# Patient Record
Sex: FEMALE | Race: OTHER | Employment: UNEMPLOYED | URBAN - METROPOLITAN AREA
[De-identification: names, ages, dates, MRNs, and addresses within clinical notes are randomized per-mention and may not be internally consistent; named-entity substitution may affect disease eponyms.]

---

## 2023-04-11 ENCOUNTER — OFFICE VISIT (OUTPATIENT)
Dept: FAMILY MEDICINE CLINIC | Facility: CLINIC | Age: 43
End: 2023-04-11

## 2023-04-11 VITALS
OXYGEN SATURATION: 98 % | WEIGHT: 120 LBS | DIASTOLIC BLOOD PRESSURE: 73 MMHG | SYSTOLIC BLOOD PRESSURE: 112 MMHG | HEART RATE: 82 BPM | TEMPERATURE: 100 F | HEIGHT: 60.24 IN | BODY MASS INDEX: 23.25 KG/M2

## 2023-04-11 DIAGNOSIS — Z12.31 ENCOUNTER FOR SCREENING MAMMOGRAM FOR MALIGNANT NEOPLASM OF BREAST: ICD-10-CM

## 2023-04-11 DIAGNOSIS — R05.1 ACUTE COUGH: ICD-10-CM

## 2023-04-11 DIAGNOSIS — N30.00 ACUTE CYSTITIS WITHOUT HEMATURIA: Primary | ICD-10-CM

## 2023-04-11 LAB
BILIRUBIN: NEGATIVE
GLUCOSE (URINE DIPSTICK): NEGATIVE MG/DL
KETONES (URINE DIPSTICK): NEGATIVE MG/DL
LEUKOCYTES: NEGATIVE
MULTISTIX LOT#: NORMAL NUMERIC
NITRITE, URINE: NEGATIVE
OCCULT BLOOD: NEGATIVE
PH, URINE: 7 (ref 4.5–8)
PROTEIN (URINE DIPSTICK): NEGATIVE MG/DL
SPECIFIC GRAVITY: 1.02 (ref 1–1.03)
URINE-COLOR: YELLOW
UROBILINOGEN,SEMI-QN: 0.2 MG/DL (ref 0–1.9)

## 2023-04-11 PROCEDURE — 3078F DIAST BP <80 MM HG: CPT | Performed by: FAMILY MEDICINE

## 2023-04-11 PROCEDURE — 3008F BODY MASS INDEX DOCD: CPT | Performed by: FAMILY MEDICINE

## 2023-04-11 PROCEDURE — 99204 OFFICE O/P NEW MOD 45 MIN: CPT | Performed by: FAMILY MEDICINE

## 2023-04-11 PROCEDURE — 81003 URINALYSIS AUTO W/O SCOPE: CPT | Performed by: FAMILY MEDICINE

## 2023-04-11 PROCEDURE — 3074F SYST BP LT 130 MM HG: CPT | Performed by: FAMILY MEDICINE

## 2023-04-11 RX ORDER — BENZONATATE 200 MG/1
200 CAPSULE ORAL 3 TIMES DAILY PRN
Qty: 30 CAPSULE | Refills: 0 | Status: SHIPPED | OUTPATIENT
Start: 2023-04-11

## 2023-04-11 RX ORDER — LEVOCETIRIZINE DIHYDROCHLORIDE 5 MG/1
5 TABLET, FILM COATED ORAL EVERY EVENING
Qty: 30 TABLET | Refills: 0 | Status: SHIPPED | OUTPATIENT
Start: 2023-04-11

## 2023-04-18 ENCOUNTER — HOSPITAL ENCOUNTER (OUTPATIENT)
Dept: MAMMOGRAPHY | Age: 43
Discharge: HOME | End: 2023-04-18
Attending: FAMILY MEDICINE
Payer: COMMERCIAL

## 2023-04-18 DIAGNOSIS — Z12.31 ENCOUNTER FOR SCREENING MAMMOGRAM FOR MALIGNANT NEOPLASM OF BREAST: ICD-10-CM

## 2023-04-18 PROCEDURE — 77063 BREAST TOMOSYNTHESIS BI: CPT | Performed by: FAMILY MEDICINE

## 2023-04-18 PROCEDURE — 77067 SCR MAMMO BI INCL CAD: CPT | Performed by: FAMILY MEDICINE

## 2023-06-13 ENCOUNTER — OFFICE VISIT (OUTPATIENT)
Dept: FAMILY MEDICINE CLINIC | Facility: CLINIC | Age: 43
End: 2023-06-13

## 2023-06-13 VITALS
HEART RATE: 83 BPM | SYSTOLIC BLOOD PRESSURE: 110 MMHG | WEIGHT: 124 LBS | DIASTOLIC BLOOD PRESSURE: 74 MMHG | BODY MASS INDEX: 24.35 KG/M2 | HEIGHT: 60 IN

## 2023-06-13 DIAGNOSIS — Z00.00 PHYSICAL EXAM: Primary | ICD-10-CM

## 2023-06-13 DIAGNOSIS — K64.8 INTERNAL HEMORRHOID: ICD-10-CM

## 2023-06-13 DIAGNOSIS — Z12.11 COLON CANCER SCREENING: ICD-10-CM

## 2023-06-13 PROCEDURE — 90715 TDAP VACCINE 7 YRS/> IM: CPT | Performed by: FAMILY MEDICINE

## 2023-06-13 PROCEDURE — 99396 PREV VISIT EST AGE 40-64: CPT | Performed by: FAMILY MEDICINE

## 2023-06-13 PROCEDURE — 3078F DIAST BP <80 MM HG: CPT | Performed by: FAMILY MEDICINE

## 2023-06-13 PROCEDURE — 3074F SYST BP LT 130 MM HG: CPT | Performed by: FAMILY MEDICINE

## 2023-06-13 PROCEDURE — 90471 IMMUNIZATION ADMIN: CPT | Performed by: FAMILY MEDICINE

## 2023-06-13 PROCEDURE — 3008F BODY MASS INDEX DOCD: CPT | Performed by: FAMILY MEDICINE

## 2023-06-13 RX ORDER — HYDROCORTISONE 25 MG/G
1 CREAM TOPICAL 2 TIMES DAILY
Qty: 28 G | Refills: 1 | Status: SHIPPED | OUTPATIENT
Start: 2023-06-13

## 2023-06-16 ENCOUNTER — LAB ENCOUNTER (OUTPATIENT)
Dept: LAB | Age: 43
End: 2023-06-16
Attending: FAMILY MEDICINE
Payer: COMMERCIAL

## 2023-06-16 DIAGNOSIS — Z00.00 PHYSICAL EXAM: ICD-10-CM

## 2023-06-16 LAB
ALBUMIN SERPL-MCNC: 3.3 G/DL (ref 3.4–5)
ALBUMIN/GLOB SERPL: 0.8 {RATIO} (ref 1–2)
ALP LIVER SERPL-CCNC: 45 U/L
ALT SERPL-CCNC: 25 U/L
ANION GAP SERPL CALC-SCNC: 4 MMOL/L (ref 0–18)
AST SERPL-CCNC: 15 U/L (ref 15–37)
BASOPHILS # BLD AUTO: 0.07 X10(3) UL (ref 0–0.2)
BASOPHILS NFR BLD AUTO: 1.2 %
BILIRUB SERPL-MCNC: 0.5 MG/DL (ref 0.1–2)
BILIRUB UR QL STRIP.AUTO: NEGATIVE
BUN BLD-MCNC: 12 MG/DL (ref 7–18)
CALCIUM BLD-MCNC: 8.6 MG/DL (ref 8.5–10.1)
CHLORIDE SERPL-SCNC: 109 MMOL/L (ref 98–112)
CHOLEST SERPL-MCNC: 200 MG/DL (ref ?–200)
CLARITY UR REFRACT.AUTO: CLEAR
CO2 SERPL-SCNC: 25 MMOL/L (ref 21–32)
COLOR UR AUTO: YELLOW
CREAT BLD-MCNC: 0.68 MG/DL
EOSINOPHIL # BLD AUTO: 0.26 X10(3) UL (ref 0–0.7)
EOSINOPHIL NFR BLD AUTO: 4.4 %
ERYTHROCYTE [DISTWIDTH] IN BLOOD BY AUTOMATED COUNT: 12.4 %
EST. AVERAGE GLUCOSE BLD GHB EST-MCNC: 111 MG/DL (ref 68–126)
FASTING PATIENT LIPID ANSWER: YES
FASTING STATUS PATIENT QL REPORTED: YES
GFR SERPLBLD BASED ON 1.73 SQ M-ARVRAT: 109 ML/MIN/1.73M2 (ref 60–?)
GLOBULIN PLAS-MCNC: 4 G/DL (ref 2.8–4.4)
GLUCOSE BLD-MCNC: 75 MG/DL (ref 70–99)
GLUCOSE UR STRIP.AUTO-MCNC: NEGATIVE MG/DL
HBA1C MFR BLD: 5.5 % (ref ?–5.7)
HCT VFR BLD AUTO: 38.1 %
HDLC SERPL-MCNC: 74 MG/DL (ref 40–59)
HGB BLD-MCNC: 12.4 G/DL
IMM GRANULOCYTES # BLD AUTO: 0.01 X10(3) UL (ref 0–1)
IMM GRANULOCYTES NFR BLD: 0.2 %
KETONES UR STRIP.AUTO-MCNC: NEGATIVE MG/DL
LDLC SERPL CALC-MCNC: 116 MG/DL (ref ?–100)
LYMPHOCYTES # BLD AUTO: 2.3 X10(3) UL (ref 1–4)
LYMPHOCYTES NFR BLD AUTO: 39.2 %
MCH RBC QN AUTO: 31.4 PG (ref 26–34)
MCHC RBC AUTO-ENTMCNC: 32.5 G/DL (ref 31–37)
MCV RBC AUTO: 96.5 FL
MONOCYTES # BLD AUTO: 0.53 X10(3) UL (ref 0.1–1)
MONOCYTES NFR BLD AUTO: 9 %
NEUTROPHILS # BLD AUTO: 2.69 X10 (3) UL (ref 1.5–7.7)
NEUTROPHILS # BLD AUTO: 2.69 X10(3) UL (ref 1.5–7.7)
NEUTROPHILS NFR BLD AUTO: 46 %
NITRITE UR QL STRIP.AUTO: POSITIVE
NONHDLC SERPL-MCNC: 126 MG/DL (ref ?–130)
OSMOLALITY SERPL CALC.SUM OF ELEC: 284 MOSM/KG (ref 275–295)
PH UR STRIP.AUTO: 6 [PH] (ref 5–8)
PLATELET # BLD AUTO: 215 10(3)UL (ref 150–450)
POTASSIUM SERPL-SCNC: 3.5 MMOL/L (ref 3.5–5.1)
PROT SERPL-MCNC: 7.3 G/DL (ref 6.4–8.2)
PROT UR STRIP.AUTO-MCNC: NEGATIVE MG/DL
RBC # BLD AUTO: 3.95 X10(6)UL
SODIUM SERPL-SCNC: 138 MMOL/L (ref 136–145)
SP GR UR STRIP.AUTO: 1.01 (ref 1–1.03)
TRIGL SERPL-MCNC: 53 MG/DL (ref 30–149)
TSI SER-ACNC: 3.45 MIU/ML (ref 0.36–3.74)
UROBILINOGEN UR STRIP.AUTO-MCNC: <2 MG/DL
VIT D+METAB SERPL-MCNC: 30.7 NG/ML (ref 30–100)
VLDLC SERPL CALC-MCNC: 9 MG/DL (ref 0–30)
WBC # BLD AUTO: 5.9 X10(3) UL (ref 4–11)

## 2023-06-16 PROCEDURE — 81001 URINALYSIS AUTO W/SCOPE: CPT

## 2023-06-16 PROCEDURE — 84443 ASSAY THYROID STIM HORMONE: CPT

## 2023-06-16 PROCEDURE — 85025 COMPLETE CBC W/AUTO DIFF WBC: CPT

## 2023-06-16 PROCEDURE — 82306 VITAMIN D 25 HYDROXY: CPT

## 2023-06-16 PROCEDURE — 80053 COMPREHEN METABOLIC PANEL: CPT

## 2023-06-16 PROCEDURE — 80061 LIPID PANEL: CPT

## 2023-06-16 PROCEDURE — 36415 COLL VENOUS BLD VENIPUNCTURE: CPT

## 2023-06-16 PROCEDURE — 83036 HEMOGLOBIN GLYCOSYLATED A1C: CPT

## 2023-06-16 PROCEDURE — 87186 SC STD MICRODIL/AGAR DIL: CPT

## 2023-06-16 PROCEDURE — 87086 URINE CULTURE/COLONY COUNT: CPT

## 2023-06-16 PROCEDURE — 87077 CULTURE AEROBIC IDENTIFY: CPT

## 2023-06-16 PROCEDURE — 86480 TB TEST CELL IMMUN MEASURE: CPT

## 2023-06-18 RX ORDER — CIPROFLOXACIN 250 MG/1
250 TABLET, FILM COATED ORAL 2 TIMES DAILY
Qty: 20 TABLET | Refills: 0 | Status: SHIPPED | OUTPATIENT
Start: 2023-06-18 | End: 2023-06-28

## 2023-06-19 LAB
M TB IFN-G CD4+ T-CELLS BLD-ACNC: 0.02 IU/ML
M TB TUBERC IFN-G BLD QL: NEGATIVE
M TB TUBERC IGNF/MITOGEN IGNF CONTROL: >10 IU/ML
QFT TB1 AG MINUS NIL: 0.01 IU/ML
QFT TB2 AG MINUS NIL: 0.02 IU/ML

## 2023-08-28 ENCOUNTER — NURSE TRIAGE (OUTPATIENT)
Dept: FAMILY MEDICINE CLINIC | Facility: CLINIC | Age: 43
End: 2023-08-28

## 2023-08-28 NOTE — TELEPHONE ENCOUNTER
Action Requested: Summary for Provider     []  Critical Lab, Recommendations Needed  [] Need Additional Advice  []   FYI    []   Need Orders  [] Need Medications Sent to Pharmacy  []  Other     Janet Maguire made, pt only wanted Dr Adair Haque, can wait 2 weeks, pain left breast come /goes , no nipple discharge   With assist of LL- sent [employer]/[id number]    Reason for call: Breast Pain  Onset: 1 week                   Reason for Disposition   Breast pain or tenderness that occurs monthly before menstrual period, and has NOT been evaluated by a doctor (or NP/PA)    Protocols used: Breast Symptoms-A-[place]

## 2023-09-05 ENCOUNTER — OFFICE VISIT (OUTPATIENT)
Dept: FAMILY MEDICINE CLINIC | Facility: CLINIC | Age: 43
End: 2023-09-05

## 2023-09-05 VITALS
HEART RATE: 78 BPM | SYSTOLIC BLOOD PRESSURE: 112 MMHG | WEIGHT: 123 LBS | DIASTOLIC BLOOD PRESSURE: 76 MMHG | BODY MASS INDEX: 24.15 KG/M2 | HEIGHT: 60 IN

## 2023-09-05 DIAGNOSIS — F32.9 REACTIVE DEPRESSION: ICD-10-CM

## 2023-09-05 DIAGNOSIS — N60.12 FIBROCYSTIC DISEASE OF LEFT BREAST: Primary | ICD-10-CM

## 2023-09-05 PROCEDURE — 3078F DIAST BP <80 MM HG: CPT | Performed by: FAMILY MEDICINE

## 2023-09-05 PROCEDURE — 3074F SYST BP LT 130 MM HG: CPT | Performed by: FAMILY MEDICINE

## 2023-09-05 PROCEDURE — 99213 OFFICE O/P EST LOW 20 MIN: CPT | Performed by: FAMILY MEDICINE

## 2023-09-05 PROCEDURE — 3008F BODY MASS INDEX DOCD: CPT | Performed by: FAMILY MEDICINE

## 2023-10-05 ENCOUNTER — TELEPHONE (OUTPATIENT)
Dept: FAMILY MEDICINE CLINIC | Facility: CLINIC | Age: 43
End: 2023-10-05

## 2023-10-05 DIAGNOSIS — N60.12 FIBROCYSTIC DISEASE OF LEFT BREAST: Primary | ICD-10-CM

## 2023-10-05 NOTE — TELEPHONE ENCOUNTER
MRN:[id number]                      After Visit Summary   3/16/2017    Serena Cunningham    MRN: [id number]           Visit Information        Provider Department      3/16/2017 5:30 PM [facility] Generic      Your next 10 appointments already scheduled     Mar 23, 2017  5:30 PM CDT   Return Visit with [facility] Tran [facility] ([facility]  [address] [address]   [phone]            Mar 28, 2017  5:00 PM CDT   Office Visit with Whitney Rob, DO   [facility]    [address] [id number] [phone]           Bring a current list of meds and any records pertaining to this visit.  For Physicals, please bring immunization records and any forms needing to be filled out.  Please arrive 10 minutes early to complete paperwork.            Mar 30, 2017  5:30 PM CDT   Return Visit with Unique Tran [facility] ([facility]  [address] [address] [phone]              MyChart Information     [employer] gives you secure access to your electronic health record. If you see a primary care provider, you can also send messages to your care team and make appointments. If you have questions, please call your primary care clinic.  If you do not have a primary care provider, please call [phone] and they will assist you.        Care EveryWhere ID     This is your Care EveryWhere ID. This could be used by other organizations to access your [place] medical records  [id number]         Brie Ramos with [facility] Radiology, needs to know if the ultrasound of the left breast is needed due to a new issue.  If ultrasound will be for a new issue, patient will need a diagnostic mammogram for left breast. Brie Ramos is requesting a call back

## 2023-10-06 NOTE — TELEPHONE ENCOUNTER
The question needing clarification is the reason for the ultrasound , is this a new issue? If do Mammo order (diagnostic) is needed.      Please clarify

## 2023-10-07 NOTE — TELEPHONE ENCOUNTER
Please call Radiology and informed them that she recently had a screening mammography April 2023. I don't believe that the want to repeat a screening mammography.

## 2023-10-09 NOTE — TELEPHONE ENCOUNTER
Patient has a NEW breast issue, please see what is needed    Diagnostic left mammogram order     Radiology is asking for order they are aware of previous imaging

## 2023-12-13 ENCOUNTER — OFFICE VISIT (OUTPATIENT)
Dept: FAMILY MEDICINE CLINIC | Facility: CLINIC | Age: 43
End: 2023-12-13

## 2023-12-13 ENCOUNTER — LAB ENCOUNTER (OUTPATIENT)
Dept: LAB | Age: 43
End: 2023-12-13
Attending: FAMILY MEDICINE
Payer: COMMERCIAL

## 2023-12-13 VITALS — HEIGHT: 60 IN | BODY MASS INDEX: 24.58 KG/M2 | WEIGHT: 125.19 LBS

## 2023-12-13 DIAGNOSIS — Z01.419 GYNECOLOGIC EXAM NORMAL: Primary | ICD-10-CM

## 2023-12-13 DIAGNOSIS — N76.0 VAGINOSIS: ICD-10-CM

## 2023-12-13 DIAGNOSIS — Z01.419 GYNECOLOGIC EXAM NORMAL: ICD-10-CM

## 2023-12-13 LAB — CANCER AG125 SERPL-ACNC: 22 U/ML (ref ?–30.2)

## 2023-12-13 PROCEDURE — 99396 PREV VISIT EST AGE 40-64: CPT | Performed by: FAMILY MEDICINE

## 2023-12-13 PROCEDURE — 36415 COLL VENOUS BLD VENIPUNCTURE: CPT

## 2023-12-13 PROCEDURE — 86304 IMMUNOASSAY TUMOR CA 125: CPT

## 2023-12-13 PROCEDURE — 3008F BODY MASS INDEX DOCD: CPT | Performed by: FAMILY MEDICINE

## 2023-12-13 RX ORDER — ASPIRIN 81 MG/1
81 TABLET ORAL
Qty: 30 TABLET | Refills: 0 | Status: SHIPPED | OUTPATIENT
Start: 2023-12-13

## 2023-12-13 RX ORDER — FLUCONAZOLE 150 MG/1
150 TABLET ORAL ONCE
Qty: 1 TABLET | Refills: 0 | Status: SHIPPED | OUTPATIENT
Start: 2023-12-13 | End: 2023-12-13

## 2023-12-14 LAB
BV BACTERIA DNA VAG QL NAA+PROBE: NEGATIVE
C GLABRATA DNA VAG QL NAA+PROBE: NEGATIVE
C KRUSEI DNA VAG QL NAA+PROBE: NEGATIVE
C TRACH DNA SPEC QL NAA+PROBE: NEGATIVE
CANDIDA DNA VAG QL NAA+PROBE: POSITIVE
N GONORRHOEA DNA SPEC QL NAA+PROBE: NEGATIVE
T VAGINALIS DNA VAG QL NAA+PROBE: NEGATIVE

## 2023-12-19 LAB — HPV I/H RISK 1 DNA SPEC QL NAA+PROBE: NEGATIVE

## 2025-02-27 ENCOUNTER — HOSPITAL ENCOUNTER (OUTPATIENT)
Dept: MAMMOGRAPHY | Facility: HOSPITAL | Age: 45
Discharge: HOME | End: 2025-02-27
Payer: COMMERCIAL

## 2025-02-27 ENCOUNTER — HOSPITAL ENCOUNTER (OUTPATIENT)
Dept: ULTRASOUND IMAGING | Facility: HOSPITAL | Age: 45
Discharge: HOME | End: 2025-02-27
Payer: COMMERCIAL

## 2025-02-27 DIAGNOSIS — N64.4 MASTODYNIA: ICD-10-CM

## 2025-02-27 PROCEDURE — 77062 BREAST TOMOSYNTHESIS BI: CPT

## 2025-02-27 PROCEDURE — 76642 ULTRASOUND BREAST LIMITED: CPT

## 2025-02-27 PROCEDURE — 77066 DX MAMMO INCL CAD BI: CPT

## 2025-03-19 ENCOUNTER — OFFICE VISIT (OUTPATIENT)
Dept: FAMILY MEDICINE CLINIC | Facility: CLINIC | Age: 45
End: 2025-03-19
Payer: COMMERCIAL

## 2025-03-19 ENCOUNTER — EKG ENCOUNTER (OUTPATIENT)
Dept: LAB | Age: 45
End: 2025-03-19
Attending: FAMILY MEDICINE
Payer: COMMERCIAL

## 2025-03-19 VITALS
BODY MASS INDEX: 24.94 KG/M2 | WEIGHT: 127 LBS | HEART RATE: 88 BPM | HEIGHT: 60 IN | SYSTOLIC BLOOD PRESSURE: 114 MMHG | DIASTOLIC BLOOD PRESSURE: 70 MMHG | TEMPERATURE: 97 F

## 2025-03-19 DIAGNOSIS — Z00.00 PHYSICAL EXAM: ICD-10-CM

## 2025-03-19 DIAGNOSIS — R92.8 ABNORMAL MAMMOGRAPHY: ICD-10-CM

## 2025-03-19 DIAGNOSIS — Z12.11 COLON CANCER SCREENING: ICD-10-CM

## 2025-03-19 DIAGNOSIS — Z00.00 PHYSICAL EXAM: Primary | ICD-10-CM

## 2025-03-19 LAB
ATRIAL RATE: 76 BPM
P AXIS: 52 DEGREES
P-R INTERVAL: 162 MS
Q-T INTERVAL: 372 MS
QRS DURATION: 76 MS
QTC CALCULATION (BEZET): 418 MS
R AXIS: 86 DEGREES
T AXIS: 61 DEGREES
VENTRICULAR RATE: 76 BPM

## 2025-03-19 PROCEDURE — 99396 PREV VISIT EST AGE 40-64: CPT | Performed by: FAMILY MEDICINE

## 2025-03-19 PROCEDURE — 93005 ELECTROCARDIOGRAM TRACING: CPT

## 2025-03-19 PROCEDURE — 93010 ELECTROCARDIOGRAM REPORT: CPT | Performed by: INTERNAL MEDICINE

## 2025-03-19 NOTE — PROGRESS NOTES
3/19/2025  8:40 AM    Anna Guy is a 44 year old female.    Chief complaint(s):   Chief Complaint   Patient presents with    Physical     Patient is coming in for a physical     HPI:     Anna Guy primary complaint is regarding CPE.     Anna Guy is a 43 year old female present today for a routine  gynecological screening and/or complete physical examination.  Her last physical exam was 20 year(s) ago. Patient's last menstrual period was 03/03/2025.    Menarche occurred at age 10 .  She is currently using  none as a form of contraception.    Anna Guy is G 0, P0, Ab 0.   She has a history of veneral infection significant for none.   She performs breast self-exams none .  Her last TDAP (orTD) booster was 06/2023.  She is current with her influenza immunization.  Her last Pap smear was 2 year(s) ago and was normal.  Her last mammogram was 2 month ago and was normal.  Regarding colon cancer  screening test she underwent colonoscopy none. None smoker.         HISTORY:  Past Medical History:    Allergic rhinitis    Hyperlipidemia      Past Surgical History:   Procedure Laterality Date    Needle biopsy left  2011    [facility] [place]    Other surgical history  2015    uterine fibroids      Family History   Problem Relation Age of Onset    Other (osteoporosis) Mother     Cancer Mother         uterine cancer    Lipids Mother     Allergies Mother     Allergies Sister     Allergies Brother     Lipids Maternal Grandmother     Breast Cancer Other     Anemia Neg     Ovarian Cancer Neg     Pancreatic Cancer Neg       Social History:   Social History     Socioeconomic History    Marital status: Single   Tobacco Use    Smoking status: Never    Smokeless tobacco: Never   Vaping Use    Vaping status: Never Used   Substance and Sexual Activity    Alcohol use: Not Currently    Drug use: Never        Immunizations:    Immunization History   Administered Date(s) Administered    TDAP 06/13/2023       Medications (Active prior to today's visit):  Current Outpatient Medications   Medication Sig Dispense Refill    aspirin 81 MG Oral Tab EC Take 1 tablet (81 mg total) by mouth Every other day PRN for Pain. 30 tablet 0    Ergocalciferol (VITAMIN D OR) Take by mouth.      hydrocortisone (ANUSOL-HC) 2.5 % External Cream Place 1 Application rectally 2 (two) times daily. (Patient not taking: Reported on 3/19/2025) 28 g 1    Ascorbic Acid (VITAMIN C OR) Take by mouth. (Patient not taking: Reported on 3/19/2025)      Glucosamine HCl (GLUCOSAMINE OR) Take by mouth. (Patient not taking: Reported on 3/19/2025)      benzonatate 200 MG Oral Cap Take 1 capsule (200 mg total) by mouth 3 (three) times daily as needed for cough. (Patient not taking: Reported on 3/19/2025) 30 capsule 0    levocetirizine 5 MG Oral Tab Take 1 tablet (5 mg total) by mouth every evening. (Patient not taking: Reported on 3/19/2025) 30 tablet 0       Allergies:  Allergies[1]      ROS:   Review of Systems   Constitutional:  Positive for fatigue. Negative for appetite change and fever.   HENT:  Negative for ear pain, hearing loss and nosebleeds.    Eyes:  Negative for visual disturbance.   Respiratory:  Negative for apnea, shortness of breath and wheezing.    Cardiovascular:  Negative for chest pain, palpitations and leg swelling.   Gastrointestinal:  Negative for abdominal pain, blood in stool, constipation, nausea and vomiting.   Endocrine: Negative for polydipsia and polyuria.   Genitourinary:  Negative for dyspareunia and menstrual problem.   Musculoskeletal:  Negative for arthralgias and back pain.   Skin:  Negative for rash.   Allergic/Immunologic: Negative for food allergies.   Neurological:  Positive for dizziness, weakness and headaches. Negative for syncope and light-headedness.   Psychiatric/Behavioral:  Positive for sleep disturbance (more sleepy). Negative for  dysphoric mood.        PHYSICAL EXAM:   VS: /70 (BP Location: Right arm, Patient Position: Sitting, Cuff Size: adult)   Pulse 88   Temp 97 °F (36.1 °C)   Ht 5' (1.524 m)   Wt 127 lb (57.6 kg)   LMP 03/03/2025   BMI 24.80 kg/m²     Physical Exam  Vitals reviewed.   Constitutional:       Appearance: She is well-developed.   HENT:      Head: Normocephalic.      Right Ear: Hearing, tympanic membrane, ear canal and external ear normal.      Left Ear: Hearing, tympanic membrane, ear canal and external ear normal.      Nose: Nose normal.      Mouth/Throat:      Mouth: Mucous membranes are moist.   Eyes:      Extraocular Movements: Extraocular movements intact.      Conjunctiva/sclera: Conjunctivae normal.      Pupils: Pupils are equal, round, and reactive to light.   Neck:      Thyroid: No thyromegaly.   Cardiovascular:      Rate and Rhythm: Normal rate and regular rhythm.      Heart sounds: Normal heart sounds, S1 normal and S2 normal. No murmur heard.  Pulmonary:      Effort: Pulmonary effort is normal.      Breath sounds: Normal breath sounds.   Chest:   Breasts:     Right: Tenderness present. No bleeding, inverted nipple, nipple discharge or skin change.      Left: Tenderness present. No bleeding, inverted nipple, nipple discharge or skin change.      Comments: Bilateral breast masses, left more than right  Abdominal:      General: Bowel sounds are normal.      Palpations: Abdomen is soft. There is no mass.      Tenderness: There is no abdominal tenderness.      Hernia: No hernia is present.   Musculoskeletal:      Cervical back: Neck supple.      Comments: Spine without scoliosis or kyphosis.  Range of motions of both upper and lower extremities are normal.   Lymphadenopathy:      Cervical: No cervical adenopathy.      Comments: LEs no edema    Skin:     Findings: No rash.   Neurological:      General: No focal deficit present.      Mental Status: She is alert.      Deep Tendon Reflexes:      Reflex  Scores:       Patellar reflexes are 2+ on the right side and 2+ on the left side.  Psychiatric:         Mood and Affect: Mood and affect normal.         LABORATORY RESULTS:       EKG / Spirometry : -     [place] [facility] [place] [place]Y  BILAT (CPT=77066/13824)    Result Date: 2/27/2025  [place] [facility]NOSTIC LOLY BILAT (CPT=77066/98408)  COMPARISON: [facility],  BREAST BILATERAL LTD (CPT=76642-50), 2/27/2025, 12:02 PM.  [facility]3D SCRE[place]ING BILAT (30891/69039), 4/18/2023, 6:29 PM.  INDICATIONS: N64.4 Mastodynia  TECHNIQUE:  Digital diagnostic mammography was performed and images were reviewed with the [employer] TRUDY 1.5.1.5 CAD device.  3D tomosynthesis was performed and reviewed. Breast ultrasound was performed with evaluation of only the specific areas of concern.  Static images were recorded by the technologist for review by the radiologist.   BREAST COMPOSITION:   Category c-Heterogeneously dense, which may obscure small masses.   FINDINGS:   MAMMOGRAM:  No mammographic abnormality is demonstrated in the region of pain in the left upper outer breast.  There is a small mass in the left upper outer breast.  The mass persists on spot compression views.  There is a small mass in the right lateral breast, which is best appreciated on the CC view.  The mass persists on spot compression views.  There are two adjacent masses in the right inner breast, which are best appreciated on the CC view.  The masses persist on spot compression views.  No suspicious microcalcification or architectural distortion in either breast.  ULTRASOUND:  RIGHT:  At the 9 o'clock position 7 cm from the nipple in the right breast, there is a probably benign cyst cluster measuring 7 x 4 x 9 mm.   At the 9 o'clock position 4 cm from the nipple in the right breast, there is a probably benign cyst cluster measuring 7 x 3 x 11 mm. This likely corresponds to the right lateral  breast mass.  At the 1 o'clock position 5 cm from the nipple in the right breast, there is a benign cyst measuring 19 x 10 x 21 mm.  There is an adjacent benign cyst measuring 14 x 8 x 15 mm.  There is an additional adjacent benign cyst measuring 20 x 8 x 23 mm.  The cysts likely correspond to the right inner mammographic breast masses.   LEFT:  A targeted ultrasound of the region of pain in the left breast was performed.  Specifically, the 2 o'clock position 9 cm from the nipple in the left breast was interrogated.  No sonographic abnormality was appreciated in this region.  At the 1 o'clock position 2 cm from the nipple in the left breast, there is a benign cyst measuring 7 x 4 x 9 mm.  This cyst likely corresponds to the left upper-outer mammographic mass.  At the 1 o'clock position 6 cm from the nipple in the left breast, there is a benign cyst measuring 7 x 4 x 7 mm.  This is likely an incidental finding.          CONCLUSION:     No mammographic or sonographic abnormality in the region of pain in the left breast.  Recommend clinical evaluation.  Probably benign cyst clusters at the 9 o'clock position 4 cm from the nipple and 9 o'clock position 7 cm from the nipple in the right breast, which likely correspond to the right lateral breast mammographic mass.  Recommend follow-up right breast diagnostic mammogram and right breast ultrasound in 6 months to monitor for stability.  Benign cysts at the 1 o'clock position 5 cm from the nipple in the right breast, which correspond to the right inner breast mammographic masses.  Benign cyst at the 1 o'clock position 2 cm from the nipple in the left breast, which correspond to the left upper-outer mammographic mass.  Benign cyst at the 1 o'clock position 6 cm from the nipple in the left breast, which is likely an incidental finding.  BI-RADS CATEGORY:   DIAGNOSTIC CATEGORY 3 - PROBABLY BENIGN FINDING.   RECOMMENDATIONS:  SHORT TERM FOLLOW-UP DIAGNOSTIC MAMMOGRAM RIGHT  BREAST IN 6 MONTHS.   SHORT TERM FOLLOW-UP ULTRASOUND RIGHT BREAST IN 6 MONTHS.       PLEASE NOTE: NORMAL MAMMOGRAM DOES NOT EXCLUDE THE POSSIBILITY OF BREAST CANCER.  A CLINICALLY SUSPICIOUS PALPABLE LUMP SHOULD BE BIOPSIED.   For patients over the age of 40, the target due date for the patient's next mammogram has been entered into a reminder system.   Patient received a discharge summary from the technologist after completion of exam.  Breast marker legend used on images  Triangle = Palpable lump Ewiiaapaayp = Skin tag or mole BB = Nipple Linear konrad = Scar Square = Pain    Dictated by (CST): Ty Moncada MD on 2/27/2025 at 10:30 AM     Finalized by (CST): Ty Moncada MD on 2/27/2025 at 12:46 PM          US BREAST BILATERAL LTD (CPT=76642-50)    Result Date: 2/27/2025  PROCEDURE: US BREAST BILATERAL LTD (CPT=76642-50)  COMPARISON: None.  INDICATIONS: N64.4 Mastodynia  TECHNIQUE:  Breast ultrasound was performed with evaluation only on specific areas of concern.   FINDINGS:   Ultrasound report is dictated under same-day diagnostic mammogram report.  Please refer to the separately dictated same day diagnostic mammogram report for findings and recommendations.         CONCLUSION:     Ultrasound report is dictated under same-day diagnostic mammogram report.  Please refer to the separately dictated same day diagnostic mammogram report for findings and recommendations.       Dictated by (CST): Ty Moncada MD on 2/27/2025 at 12:42 PM     Finalized by (CST): Ty Moncada MD on 2/27/2025 at 12:44 PM             ASSESSMENT/PLAN:   Assessment   Encounter Diagnoses   Name Primary?    Physical exam Yes    Abnormal mammography     Colon cancer screening        Assessment and Plan:     Anna Rodrigues [facility] checkup as follows:    LABORATORY & ORDERS:   Orders Placed This Encounter   Procedures    CBC With Differential With Platelet    Comp Metabolic Panel (14)    Hemoglobin A1C    Lipid Panel     TSH W Reflex To Free T4    Vitamin D    Urinalysis with Culture Reflex      REFERRALS: generated today : [facility] - INTERNAL  EKG 12-LEAD  LOLY DIAGNOSTIC BILATERAL (CPT=77066) .    IMMUNIZATIONS ordered and given today include: none.    RECOMMENDATIONS given include: ANTICIPATORY GUIDANCE  topics covered today include: safety (i.e. seat belts, helmets, sunscreen, protective sports gear ), nutrition (i.e. healthy meals and snacks (i.e. avoid junk food and high-carbohydrate foods); athletic conditioning, fluids; low fat milk, limit to less than 20 oz. a day; dental care with her dentist), and healthy habits & social competence & responsibilities: Recommendations on physical activity; exercise daily or at least 3 times a week for 30-60 minutes doing cardiovascular exercise. Patient educated on self breast examination to be done on a monthly basis.   REFUSALS:  Although recommended, the patient refuses the following: none .      FOLLOW-UP: Schedule a follow-up visit in 12 months.            Orders This Visit:  Orders Placed This Encounter   Procedures    CBC With Differential With Platelet    Comp Metabolic Panel (14)    Hemoglobin A1C    Lipid Panel    TSH W Reflex To Free T4    Vitamin D    Urinalysis with Culture Reflex       Meds This Visit:  Requested Prescriptions      No prescriptions requested or ordered in this encounter       Imaging & Referrals:  [facility] - INTERNAL  EKG 12-LEAD  LOLY DIAGNOSTIC BILATERAL (CPT=77066)         NICO LUCIANO MD         [1] No Known Allergies

## 2025-03-24 ENCOUNTER — LAB ENCOUNTER (OUTPATIENT)
Dept: LAB | Age: 45
End: 2025-03-24
Attending: FAMILY MEDICINE
Payer: COMMERCIAL

## 2025-03-24 DIAGNOSIS — Z00.00 PHYSICAL EXAM: ICD-10-CM

## 2025-03-24 LAB
ALBUMIN SERPL-MCNC: 4.2 G/DL (ref 3.2–4.8)
ALBUMIN/GLOB SERPL: 1.5 {RATIO} (ref 1–2)
ALP LIVER SERPL-CCNC: 36 U/L
ALT SERPL-CCNC: 8 U/L
ANION GAP SERPL CALC-SCNC: 5 MMOL/L (ref 0–18)
AST SERPL-CCNC: 15 U/L (ref ?–34)
BASOPHILS # BLD AUTO: 0.05 X10(3) UL (ref 0–0.2)
BASOPHILS NFR BLD AUTO: 0.8 %
BILIRUB SERPL-MCNC: 0.5 MG/DL (ref 0.3–1.2)
BILIRUB UR QL: NEGATIVE
BUN BLD-MCNC: 12 MG/DL (ref 9–23)
BUN/CREAT SERPL: 16.7 (ref 10–20)
CALCIUM BLD-MCNC: 8.9 MG/DL (ref 8.7–10.4)
CHLORIDE SERPL-SCNC: 109 MMOL/L (ref 98–112)
CHOLEST SERPL-MCNC: 239 MG/DL (ref ?–200)
CLARITY UR: CLEAR
CO2 SERPL-SCNC: 25 MMOL/L (ref 21–32)
CREAT BLD-MCNC: 0.72 MG/DL
DEPRECATED RDW RBC AUTO: 44.9 FL (ref 35.1–46.3)
EGFRCR SERPLBLD CKD-EPI 2021: 104 ML/MIN/1.73M2 (ref 60–?)
EOSINOPHIL # BLD AUTO: 0.23 X10(3) UL (ref 0–0.7)
EOSINOPHIL NFR BLD AUTO: 3.9 %
ERYTHROCYTE [DISTWIDTH] IN BLOOD BY AUTOMATED COUNT: 12.6 % (ref 11–15)
EST. AVERAGE GLUCOSE BLD GHB EST-MCNC: 108 MG/DL (ref 68–126)
FASTING PATIENT LIPID ANSWER: YES
FASTING STATUS PATIENT QL REPORTED: YES
GLOBULIN PLAS-MCNC: 2.8 G/DL (ref 2–3.5)
GLUCOSE BLD-MCNC: 84 MG/DL (ref 70–99)
GLUCOSE UR-MCNC: NORMAL MG/DL
HBA1C MFR BLD: 5.4 % (ref ?–5.7)
HCT VFR BLD AUTO: 38.6 %
HDLC SERPL-MCNC: 68 MG/DL (ref 40–59)
HGB BLD-MCNC: 13.2 G/DL
HGB UR QL STRIP.AUTO: NEGATIVE
IMM GRANULOCYTES # BLD AUTO: 0 X10(3) UL (ref 0–1)
IMM GRANULOCYTES NFR BLD: 0 %
KETONES UR-MCNC: NEGATIVE MG/DL
LDLC SERPL CALC-MCNC: 159 MG/DL (ref ?–100)
LEUKOCYTE ESTERASE UR QL STRIP.AUTO: NEGATIVE
LYMPHOCYTES # BLD AUTO: 2.25 X10(3) UL (ref 1–4)
LYMPHOCYTES NFR BLD AUTO: 37.8 %
MCH RBC QN AUTO: 33.2 PG (ref 26–34)
MCHC RBC AUTO-ENTMCNC: 34.2 G/DL (ref 31–37)
MCV RBC AUTO: 97.2 FL
MONOCYTES # BLD AUTO: 0.62 X10(3) UL (ref 0.1–1)
MONOCYTES NFR BLD AUTO: 10.4 %
NEUTROPHILS # BLD AUTO: 2.8 X10 (3) UL (ref 1.5–7.7)
NEUTROPHILS # BLD AUTO: 2.8 X10(3) UL (ref 1.5–7.7)
NEUTROPHILS NFR BLD AUTO: 47.1 %
NONHDLC SERPL-MCNC: 171 MG/DL (ref ?–130)
OSMOLALITY SERPL CALC.SUM OF ELEC: 287 MOSM/KG (ref 275–295)
PH UR: 6 [PH] (ref 5–8)
PLATELET # BLD AUTO: 237 10(3)UL (ref 150–450)
POTASSIUM SERPL-SCNC: 4.2 MMOL/L (ref 3.5–5.1)
PROT SERPL-MCNC: 7 G/DL (ref 5.7–8.2)
PROT UR-MCNC: NEGATIVE MG/DL
RBC # BLD AUTO: 3.97 X10(6)UL
SODIUM SERPL-SCNC: 139 MMOL/L (ref 136–145)
SP GR UR STRIP: 1.01 (ref 1–1.03)
TRIGL SERPL-MCNC: 71 MG/DL (ref 30–149)
TSI SER-ACNC: 4.15 UIU/ML (ref 0.55–4.78)
UROBILINOGEN UR STRIP-ACNC: NORMAL
VIT D+METAB SERPL-MCNC: 38.2 NG/ML (ref 30–100)
VLDLC SERPL CALC-MCNC: 14 MG/DL (ref 0–30)
WBC # BLD AUTO: 6 X10(3) UL (ref 4–11)

## 2025-03-24 PROCEDURE — 85025 COMPLETE CBC W/AUTO DIFF WBC: CPT

## 2025-03-24 PROCEDURE — 84443 ASSAY THYROID STIM HORMONE: CPT

## 2025-03-24 PROCEDURE — 87077 CULTURE AEROBIC IDENTIFY: CPT

## 2025-03-24 PROCEDURE — 83036 HEMOGLOBIN GLYCOSYLATED A1C: CPT

## 2025-03-24 PROCEDURE — 81001 URINALYSIS AUTO W/SCOPE: CPT

## 2025-03-24 PROCEDURE — 80061 LIPID PANEL: CPT

## 2025-03-24 PROCEDURE — 36415 COLL VENOUS BLD VENIPUNCTURE: CPT

## 2025-03-24 PROCEDURE — 80053 COMPREHEN METABOLIC PANEL: CPT

## 2025-03-24 PROCEDURE — 87186 SC STD MICRODIL/AGAR DIL: CPT

## 2025-03-24 PROCEDURE — 82306 VITAMIN D 25 HYDROXY: CPT

## 2025-03-24 PROCEDURE — 87086 URINE CULTURE/COLONY COUNT: CPT

## 2025-03-26 ENCOUNTER — NURSE TRIAGE (OUTPATIENT)
Dept: FAMILY MEDICINE CLINIC | Facility: CLINIC | Age: 45
End: 2025-03-26

## 2025-03-26 ENCOUNTER — TELEPHONE (OUTPATIENT)
Dept: FAMILY MEDICINE CLINIC | Facility: CLINIC | Age: 45
End: 2025-03-26

## 2025-03-26 RX ORDER — CIPROFLOXACIN 250 MG/1
250 TABLET, FILM COATED ORAL 2 TIMES DAILY
Qty: 20 TABLET | Refills: 0 | Status: SHIPPED | OUTPATIENT
Start: 2025-03-26 | End: 2025-04-05

## 2025-03-26 NOTE — TELEPHONE ENCOUNTER
Action Requested: Summary for Provider     []  Critical Lab, Recommendations Needed  [] Need Additional Advice  []   FYI    []   Need Orders  [] Need Medications Sent to Pharmacy  []  Other     SUMMARY:   appointment was made.    Per the patient she forgot to inform Dr vyas at the 3/19/25 office visit that her middle toe on her right foot toe nail is brown in color for months   Denies any pain, redness.   Only one toe.   She asked it can be addressed at the office visit which was previously made.  Added to the appointment.    Reason for call: Toenail Care  Onset: Data Unavailable      General Assessment (questions to ask the caller)  Do you consider this a medical emergency?: No  Can you access 911?: Yes  Do you have any pain?: No  Do you have a fever?: No  Additional Assessments  Are these symptoms new, recurrent, or chronic?: new (over a year)

## 2025-04-09 ENCOUNTER — OFFICE VISIT (OUTPATIENT)
Dept: FAMILY MEDICINE CLINIC | Facility: CLINIC | Age: 45
End: 2025-04-09

## 2025-04-09 VITALS
HEART RATE: 86 BPM | BODY MASS INDEX: 24 KG/M2 | SYSTOLIC BLOOD PRESSURE: 109 MMHG | WEIGHT: 122.25 LBS | DIASTOLIC BLOOD PRESSURE: 70 MMHG

## 2025-04-09 DIAGNOSIS — S16.1XXA STRAIN OF NECK MUSCLE, INITIAL ENCOUNTER: ICD-10-CM

## 2025-04-09 DIAGNOSIS — B35.1 TOENAIL FUNGUS: ICD-10-CM

## 2025-04-09 DIAGNOSIS — E78.00 PURE HYPERCHOLESTEROLEMIA: Primary | ICD-10-CM

## 2025-04-09 PROCEDURE — 99214 OFFICE O/P EST MOD 30 MIN: CPT | Performed by: FAMILY MEDICINE

## 2025-04-09 RX ORDER — OMEGA-3 FATTY ACIDS/FISH OIL 300-1000MG
2 CAPSULE ORAL 2 TIMES DAILY
Qty: 180 CAPSULE | Refills: 1 | Status: SHIPPED | OUTPATIENT
Start: 2025-04-09 | End: 2025-05-09

## 2025-04-09 RX ORDER — FLUCONAZOLE 150 MG/1
150 TABLET ORAL ONCE
COMMUNITY
Start: 2024-12-13

## 2025-04-09 RX ORDER — ATORVASTATIN CALCIUM 20 MG/1
20 TABLET, FILM COATED ORAL NIGHTLY
Qty: 90 TABLET | Refills: 3 | Status: SHIPPED | OUTPATIENT
Start: 2025-04-09

## 2025-04-09 RX ORDER — CIPROFLOXACIN 250 MG/1
250 TABLET, FILM COATED ORAL 2 TIMES DAILY
COMMUNITY

## 2025-04-09 RX ORDER — TEA TREE OIL 100 %
OIL (ML) TOPICAL
Qty: 180 CAPSULE | Refills: 3 | Status: SHIPPED | OUTPATIENT
Start: 2025-04-09

## 2025-04-09 NOTE — PROGRESS NOTES
4/9/2025  9:40 AM    Anna Guy is a 44 year old female.    Chief complaint(s):   Chief Complaint   Patient presents with    Test Results    Toenail     Patient states right toe has fungus     Neck Pain     Patient states neck pain that radiates down her shoulder     Medication Request     Glucosimine refill      HPI:     Anna Guy primary complaint is regarding multiple complaints.       In regard to the hyperlipidemia, she has had hypercholesterolemia since uncertain time ago.  Current treatment includes NONE (medication) and a low cholesterol/low fat diet.  Compliance with treatment has been fair.  she denies experiencing any hypercholesterolemia related symptoms.  Average lipid levels with medication run slightly High per patient report.  Most recent lab tests include total cholesterol level ( fasting 239 mg/dl on last week), triglycerides ( 71 mg/dl ), HDL ( fasting 68 mg/dL ), and LDL cholesterol ( fasting 159 mg/dl ).         In addition patient is concerned of having possible toenail fungus on her right third toe nail.  Has never been treated for fungus infection on the nails in the past.    Furthermore she is complaining of left lateral neck pain for the past few weeks on and off.  Especially gets worse after a long day at work.  At present time there is no symptoms.  Denies any headaches or upper extremity paresthesia.    HISTORY:  Past Medical History[1]   Past Surgical History[2]   Family History[3]   Social History: Short Social Hx on File[4]     Immunizations:   Immunization History   Administered Date(s) Administered    TDAP 06/13/2023       Medications (Active prior to today's visit):  Current Medications[5]    Allergies:  Allergies[6]      ROS:   Review of Systems   Constitutional:  Negative for appetite change and fever.   Eyes:  Negative for visual disturbance.   Respiratory:  Negative for shortness of breath.    Cardiovascular:  Negative for  chest pain.   Gastrointestinal:  Negative for abdominal pain, nausea and vomiting.   Musculoskeletal:  Positive for neck pain. Negative for back pain.   Skin:  Negative for rash.        Toenail fungus   Neurological:  Negative for dizziness and headaches.       PHYSICAL EXAM:   VS: /70 (BP Location: Right arm, Patient Position: Sitting, Cuff Size: adult)   Pulse 86   Wt 122 lb 4 oz (55.5 kg)   LMP 03/28/2025   BMI 23.88 kg/m²     Physical Exam  Vitals reviewed.   Constitutional:       General: She is not in acute distress.     Appearance: Normal appearance.   HENT:      Head: Normocephalic.   Eyes:      Conjunctiva/sclera: Conjunctivae normal.   Cardiovascular:      Rate and Rhythm: Normal rate.   Pulmonary:      Effort: Pulmonary effort is normal.   Musculoskeletal:      Cervical back: Neck supple. No rigidity or torticollis. No pain with movement.   Feet:      Comments: Mild toenail thickness on her right 3rd toenail  Skin:     Findings: No rash.   Psychiatric:         Mood and Affect: Mood normal.         LABORATORY RESULTS:   No results found for: \"URCOLOR\", \"URCLA\", \"URINELEUK\", \"URINENITRITE\", \"URINEBLOOD\"   Results for orders placed or performed in visit on 03/24/25   CBC With Differential With Platelet    Collection Time: 03/24/25  7:37 AM   Result Value Ref Range    WBC 6.0 4.0 - 11.0 x10(3) uL    RBC 3.97 3.80 - 5.30 x10(6)uL    HGB 13.2 12.0 - 16.0 g/dL    HCT 38.6 35.0 - 48.0 %    MCV 97.2 80.0 - 100.0 fL    MCH 33.2 26.0 - 34.0 pg    MCHC 34.2 31.0 - 37.0 g/dL    RDW-SD 44.9 35.1 - 46.3 fL    RDW 12.6 11.0 - 15.0 %    .0 150.0 - 450.0 10(3)uL    Neutrophil Absolute Prelim 2.80 1.50 - 7.70 x10 (3) uL    Neutrophil Absolute 2.80 1.50 - 7.70 x10(3) uL    Lymphocyte Absolute 2.25 1.00 - 4.00 x10(3) uL    Monocyte Absolute 0.62 0.10 - 1.00 x10(3) uL    Eosinophil Absolute 0.23 0.00 - 0.70 x10(3) uL    Basophil Absolute 0.05 0.00 - 0.20 x10(3) uL    Immature Granulocyte Absolute 0.00 0.00 -  1.00 x10(3) uL    Neutrophil % 47.1 %    Lymphocyte % 37.8 %    Monocyte % 10.4 %    Eosinophil % 3.9 %    Basophil % 0.8 %    Immature Granulocyte % 0.0 %   Comp Metabolic Panel (14)    Collection Time: 03/24/25  7:37 AM   Result Value Ref Range    Glucose 84 70 - 99 mg/dL    Sodium 139 136 - 145 mmol/L    Potassium 4.2 3.5 - 5.1 mmol/L    Chloride 109 98 - 112 mmol/L    CO2 25.0 21.0 - 32.0 mmol/L    Anion Gap 5 0 - 18 mmol/L    BUN 12 9 - 23 mg/dL    Creatinine 0.72 0.55 - 1.02 mg/dL    BUN/CREA Ratio 16.7 10.0 - 20.0    Calcium, Total 8.9 8.7 - 10.4 mg/dL    Calculated Osmolality 287 275 - 295 mOsm/kg    eGFR-Cr 104 >=60 mL/min/1.73m2    ALT 8 (L) 10 - 49 U/L    AST 15 <34 U/L    Alkaline Phosphatase 36 (L) 39 - 100 U/L    Bilirubin, Total 0.5 0.3 - 1.2 mg/dL    Total Protein 7.0 5.7 - 8.2 g/dL    Albumin 4.2 3.2 - 4.8 g/dL    Globulin  2.8 2.0 - 3.5 g/dL    A/G Ratio 1.5 1.0 - 2.0    Patient Fasting for CMP? Yes    Hemoglobin A1C    Collection Time: 03/24/25  7:37 AM   Result Value Ref Range    HgbA1C 5.4 <5.7 %    Estimated Average Glucose 108 68 - 126 mg/dL   Lipid Panel    Collection Time: 03/24/25  7:37 AM   Result Value Ref Range    Cholesterol, Total 239 (H) <200 mg/dL    HDL Cholesterol 68 (H) 40 - 59 mg/dL    Triglycerides 71 30 - 149 mg/dL    LDL Cholesterol 159 (H) <100 mg/dL    VLDL 14 0 - 30 mg/dL    Non HDL Chol 171 (H) <130 mg/dL    Patient Fasting for Lipid? Yes    TSH W Reflex To Free T4    Collection Time: 03/24/25  7:37 AM   Result Value Ref Range    TSH 4.153 0.550 - 4.780 uIU/mL   Urinalysis with Culture Reflex    Collection Time: 03/24/25  7:37 AM    Specimen: Urine   Result Value Ref Range    Urine Color Light-Yellow Yellow    Clarity Urine Clear Clear    Spec Gravity 1.013 1.005 - 1.030    Glucose Urine Normal Normal mg/dL    Bilirubin Urine Negative Negative    Ketones Urine Negative Negative mg/dL    Blood Urine Negative Negative    pH Urine 6.0 5.0 - 8.0    Protein Urine Negative  Negative mg/dL    Urobilinogen Urine Normal Normal    Nitrite Urine 2+ (A) Negative    Leukocyte Esterase Urine Negative Negative    WBC Urine 1-5 0 - 5 /HPF    RBC Urine 0-2 0 - 2 /HPF    Bacteria Urine Rare (A) None Seen /HPF    Squamous Epi. Cells None Seen None Seen /HPF    Renal Tubular Epithelial Cells None Seen None Seen /HPF    Transitional Cells None Seen None Seen /HPF    Yeast Urine None Seen None Seen /HPF   Vitamin D, 25-Hydroxy    Collection Time: 03/24/25  7:37 AM   Result Value Ref Range    Vitamin D, 25OH, Total 38.2 30.0 - 100.0 ng/mL   Urine Culture, Routine    Collection Time: 03/24/25  7:37 AM    Specimen: Urine, clean catch   Result Value Ref Range    Urine Culture >100,000 CFU/ML Escherichia coli (A)        Susceptibility    Escherichia coli -  (no method available)     Ampicillin <=2 Sensitive      Cefazolin <=4 Sensitive      Ciprofloxacin <=0.25 Sensitive      Gentamicin <=1 Sensitive      Meropenem <=0.25 Sensitive      Levofloxacin <=0.12 Sensitive      Nitrofurantoin <=16 Sensitive      Piperacillin + Tazobactam <=4 Sensitive      Trimethoprim/Sulfa <=20 Sensitive      EKG / Spirometry : -     Radiology: No results found.     ASSESSMENT/PLAN:   Assessment   Encounter Diagnoses   Name Primary?    Pure hypercholesterolemia Yes    Toenail fungus     Strain of neck muscle, initial encounter      1. Pure hypercholesterolemia   Hyperlipidemia     MEDICATIONS:   Requested Prescriptions     Signed Prescriptions Disp Refills    atorvastatin 20 MG Oral Tab 90 tablet 3     Sig: Take 1 tablet (20 mg total) by mouth nightly.    Omega 3 1000 MG Oral Cap 180 capsule 1     Sig: Take 2 capsules by mouth in the morning and 2 capsules before bedtime.     RECOMMENDATIONS given include: exercise and low cholesterol/low fat diet. Patient was instructed to taker the medication nightly. Weight reduction plan was discussed. Furthermore, patient was informed to call our office with any questions or concerns.  Notify Dr Luciano or the [facility] if there is a deterioration or worsening of the medical condition. Also, inform the doctor with any new symptoms or medications' side effects.    REFUSALS:  none.    FOLLOW-UP: Schedule a follow-up visit in 12 months.     2. Toenail fungus    Lab:   - Fungus Hair, Skin, Nail Culture (Dermatophyte); Future  - Fungus Hair, Skin, Nail Culture (Dermatophyte)    3. Strain of neck muscle, initial encounter    MEDICATIONS:      Misc Natural Products (GLUCOSAMINE CHONDROITIN VIT D3) Oral Cap 180 capsule 3     Sig: Take 2 tab po Q day     RECOMMENDATIONS given include: Patient was reassured of  his medical condition and all questions and concerns were answered. Patient was informed to please, call our office with any new or further questions or concerns that may come up in the near future. Notify Dr Luciano or the [facility] if there is a deterioration or worsening of the medical condition. Also, inform the doctor with any new symptoms or medications' side effects.      FOLLOW-UP: Schedule a follow-up visit in prn .             Orders This Visit:  Orders Placed This Encounter   Procedures    Fungus Hair, Skin, Nail Culture (Dermatophyte)       Meds This Visit:  Requested Prescriptions     Signed Prescriptions Disp Refills    atorvastatin 20 MG Oral Tab 90 tablet 3     Sig: Take 1 tablet (20 mg total) by mouth nightly.    Omega 3 1000 MG Oral Cap 180 capsule 1     Sig: Take 2 capsules by mouth in the morning and 2 capsules before bedtime.    Misc Natural Products (GLUCOSAMINE CHONDROITIN VIT D3) Oral Cap 180 capsule 3     Sig: Take 2 tab po Q day       Imaging & Referrals:  None         NICO LUCIANO MD         [1]   Past Medical History:   Allergic rhinitis    Hyperlipidemia   [2]   Past Surgical History:  Procedure Laterality Date    Needle biopsy left  2011    [facility] [place]    Other surgical history  2015    uterine fibroids   [3]   Family History  Problem  Relation Age of Onset    Other (osteoporosis) Mother     Cancer Mother         uterine cancer    Lipids Mother     Allergies Mother     Allergies Sister     Allergies Brother     Lipids Maternal Grandmother     Breast Cancer Other     Anemia Neg     Ovarian Cancer Neg     Pancreatic Cancer Neg    [4]   Social History  Socioeconomic History    Marital status: Single   Tobacco Use    Smoking status: Never    Smokeless tobacco: Never   Vaping Use    Vaping status: Never Used   Substance and Sexual Activity    Alcohol use: Not Currently    Drug use: Never   [5]   Current Outpatient Medications   Medication Sig Dispense Refill    fluconazole 150 MG Oral Tab Take 1 tablet (150 mg total) by mouth one time.      ciprofloxacin 250 MG Oral Tab Take 1 tablet (250 mg total) by mouth in the morning and 1 tablet (250 mg total) before bedtime.      atorvastatin 20 MG Oral Tab Take 1 tablet (20 mg total) by mouth nightly. 90 tablet 3    Omega 3 1000 MG Oral Cap Take 2 capsules by mouth in the morning and 2 capsules before bedtime. 180 capsule 1    Misc Natural Products (GLUCOSAMINE CHONDROITIN VIT D3) Oral Cap Take 2 tab po Q day 180 capsule 3    Glucosamine HCl (GLUCOSAMINE OR) Take by mouth.      Ergocalciferol (VITAMIN D OR) Take by mouth.      aspirin 81 MG Oral Tab EC Take 1 tablet (81 mg total) by mouth Every other day PRN for Pain. 30 tablet 0    hydrocortisone (ANUSOL-HC) 2.5 % External Cream Place 1 Application rectally 2 (two) times daily. (Patient not taking: Reported on 3/19/2025) 28 g 1    Ascorbic Acid (VITAMIN C OR) Take by mouth. (Patient not taking: Reported on 3/19/2025)      benzonatate 200 MG Oral Cap Take 1 capsule (200 mg total) by mouth 3 (three) times daily as needed for cough. (Patient not taking: Reported on 3/19/2025) 30 capsule 0    levocetirizine 5 MG Oral Tab Take 1 tablet (5 mg total) by mouth every evening. (Patient not taking: Reported on 3/19/2025) 30 tablet 0   [6] No Known Allergies